# Patient Record
Sex: FEMALE | Race: BLACK OR AFRICAN AMERICAN | NOT HISPANIC OR LATINO | Employment: STUDENT | URBAN - METROPOLITAN AREA
[De-identification: names, ages, dates, MRNs, and addresses within clinical notes are randomized per-mention and may not be internally consistent; named-entity substitution may affect disease eponyms.]

---
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
## 2020-02-23 ENCOUNTER — HEALTH MAINTENANCE LETTER (OUTPATIENT)
Age: 20
End: 2020-02-23

## 2020-03-02 ENCOUNTER — TELEPHONE (OUTPATIENT)
Dept: PSYCHIATRY | Facility: CLINIC | Age: 20
End: 2020-03-02

## 2020-03-02 NOTE — TELEPHONE ENCOUNTER
"Tried to call patient at number on file [phone].  The message received was \"the person you are dialing is not able to receive calls at this time.\"  The phone then started ringing, but no answer after 10 rings.  Attempted to call again with the same result.      "

## 2020-03-02 NOTE — TELEPHONE ENCOUNTER
Patient called and left voicemail message in clinic over weekend. Patient is requesting a call back from Maria Victoria or someone in her care team. She did not provide a reason why in her message.

## 2020-03-03 NOTE — TELEPHONE ENCOUNTER
"Attempted to return patient's call, and the response was still \"the person you are dialing is not able to receive calls at this time.\"   "

## 2020-03-05 NOTE — TELEPHONE ENCOUNTER
"Attempted x2 to reach pt at the number listed in chart. Writer received message, \"The person you have dialed is not able to receive calls at this time.\" Unable to leave a VM.  "

## 2020-03-16 NOTE — TELEPHONE ENCOUNTER
Message   Received: 3 days ago   Message Contents   Nhi Sanderson Emily, RN    Phone Number: [phone]               [facility], would like for Maria Victoria, to give her a call. This is all the info give.     Thanks!      --Placed additional phone call to pt at number listed above. No answer; LVM with writer's return contact information.

## 2020-03-17 ENCOUNTER — MYC MEDICAL ADVICE (OUTPATIENT)
Dept: PSYCHIATRY | Facility: CLINIC | Age: 20
End: 2020-03-17

## 2020-12-06 ENCOUNTER — HEALTH MAINTENANCE LETTER (OUTPATIENT)
Age: 20
End: 2020-12-06

## 2021-03-25 ENCOUNTER — OFFICE VISIT (OUTPATIENT)
Dept: PEDIATRICS | Facility: CLINIC | Age: 21
End: 2021-03-25
Payer: COMMERCIAL

## 2021-03-25 VITALS
HEIGHT: 63 IN | BODY MASS INDEX: 30.67 KG/M2 | HEART RATE: 90 BPM | SYSTOLIC BLOOD PRESSURE: 98 MMHG | OXYGEN SATURATION: 98 % | DIASTOLIC BLOOD PRESSURE: 56 MMHG | TEMPERATURE: 99.1 F | WEIGHT: 173.1 LBS

## 2021-03-25 DIAGNOSIS — E55.9 VITAMIN D DEFICIENCY: Primary | ICD-10-CM

## 2021-03-25 DIAGNOSIS — F90.0 ATTENTION DEFICIT HYPERACTIVITY DISORDER (ADHD), PREDOMINANTLY INATTENTIVE TYPE: ICD-10-CM

## 2021-03-25 DIAGNOSIS — F41.1 GAD (GENERALIZED ANXIETY DISORDER): ICD-10-CM

## 2021-03-25 DIAGNOSIS — F33.1 MODERATE RECURRENT MAJOR DEPRESSION (H): ICD-10-CM

## 2021-03-25 PROCEDURE — 96127 BRIEF EMOTIONAL/BEHAV ASSMT: CPT | Performed by: NURSE PRACTITIONER

## 2021-03-25 PROCEDURE — 99214 OFFICE O/P EST MOD 30 MIN: CPT | Performed by: NURSE PRACTITIONER

## 2021-03-25 RX ORDER — CARBOXYMETHYLCELLULOSE SODIUM 5 MG/ML
1-2 SOLUTION/ DROPS OPHTHALMIC 3 TIMES DAILY PRN
Qty: 1 BOTTLE | Refills: 5 | Status: CANCELLED | OUTPATIENT
Start: 2021-03-25

## 2021-03-25 RX ORDER — SERTRALINE HYDROCHLORIDE 100 MG/1
TABLET, FILM COATED ORAL
Qty: 45 TABLET | Refills: 2 | Status: SHIPPED | OUTPATIENT
Start: 2021-03-25 | End: 2021-08-13

## 2021-03-25 RX ORDER — BUPROPION HYDROCHLORIDE 300 MG/1
300 TABLET ORAL EVERY MORNING
Qty: 90 TABLET | Refills: 3 | Status: SHIPPED | OUTPATIENT
Start: 2021-03-25 | End: 2021-08-20

## 2021-03-25 RX ORDER — MULTIVIT-MIN/IRON/FOLIC ACID/K 18-600-40
2 CAPSULE ORAL DAILY
Qty: 180 CAPSULE | Refills: 3 | Status: SHIPPED | OUTPATIENT
Start: 2021-03-25

## 2021-03-25 RX ORDER — DEXTROAMPHETAMINE SACCHARATE, AMPHETAMINE ASPARTATE MONOHYDRATE, DEXTROAMPHETAMINE SULFATE AND AMPHETAMINE SULFATE 5; 5; 5; 5 MG/1; MG/1; MG/1; MG/1
20 CAPSULE, EXTENDED RELEASE ORAL DAILY
Qty: 30 CAPSULE | Refills: 0 | Status: SHIPPED | OUTPATIENT
Start: 2021-03-25 | End: 2021-06-03

## 2021-03-25 ASSESSMENT — ANXIETY QUESTIONNAIRES
GAD7 TOTAL SCORE: 13
5. BEING SO RESTLESS THAT IT IS HARD TO SIT STILL: MORE THAN HALF THE DAYS
1. FEELING NERVOUS, ANXIOUS, OR ON EDGE: SEVERAL DAYS
3. WORRYING TOO MUCH ABOUT DIFFERENT THINGS: MORE THAN HALF THE DAYS
7. FEELING AFRAID AS IF SOMETHING AWFUL MIGHT HAPPEN: MORE THAN HALF THE DAYS
2. NOT BEING ABLE TO STOP OR CONTROL WORRYING: MORE THAN HALF THE DAYS
IF YOU CHECKED OFF ANY PROBLEMS ON THIS QUESTIONNAIRE, HOW DIFFICULT HAVE THESE PROBLEMS MADE IT FOR YOU TO DO YOUR WORK, TAKE CARE OF THINGS AT HOME, OR GET ALONG WITH OTHER PEOPLE: VERY DIFFICULT
6. BECOMING EASILY ANNOYED OR IRRITABLE: MORE THAN HALF THE DAYS

## 2021-03-25 ASSESSMENT — PATIENT HEALTH QUESTIONNAIRE - PHQ9
5. POOR APPETITE OR OVEREATING: MORE THAN HALF THE DAYS
SUM OF ALL RESPONSES TO PHQ QUESTIONS 1-9: 19

## 2021-03-25 ASSESSMENT — MIFFLIN-ST. JEOR: SCORE: 1519.18

## 2021-03-25 NOTE — PROGRESS NOTES
Assessment & Plan     (E55.9) Vitamin D deficiency  (primary encounter diagnosis)  Plan: Cholecalciferol (VITAMIN D) 50 MCG (2000 UT)         CAPS        Will recheck in a few months    (F33.1) Moderate recurrent major depression (H)  Comment: Much better than in the past. No SI/HI. But worse than when she was actively seein gpsych.   Plan: buPROPion (WELLBUTRIN XL) 300 MG 24 hr tablet,         sertraline (ZOLOFT) 100 MG tablet, MENTAL         HEALTH REFERRAL  - Adult; Outpatient Treatment;        Individual/Couples/Family/Group Therapy/Health         Psychology; G: [facility]         [phone]; We will contact you to schedule        the appointment or please call with any         questions          -Re-start meds. Discussed plan to re-start them one at a time  -Re-start therapy and psychiatry  -Reviewed r/b/se   -Contracted for safety. Crisis resources discussed.     (F41.1) SUMEET (generalized anxiety disorder)  Plan: sertraline (ZOLOFT) 100 MG tablet            (F90.0) Attention deficit hyperactivity disorder (ADHD), predominantly inattentive type  Comment: Re-start meds  Plan: amphetamine-dextroamphetamine (ADDERALL XR) 20         MG 24 hr capsule          ARYAN [employer] CARLOS Marte is a 21 year old who presents for the following health issues     HPI     Depression and Anxiety Follow-Up    How are you doing with your depression since your last visit? Worsened, stopped seeing psychiatry and therapy especially due to COVID-19 restrictions and lack of medication    How are you doing with your anxiety since your last visit?  Worsened, as above    Are you having other symptoms that might be associated with depression or anxiety? Yes:  weight gain, lack of motivation, increase in irritability, more drawn off    Have you had a significant life event? No     Do you have any concerns with your use of alcohol or other drugs? No    Pt  "wants to discuss which are best medications to take at this time.    Social History     Tobacco Use     Smoking status: Never Smoker     Smokeless tobacco: Never Used   Substance Use Topics     Alcohol use: No     Drug use: No     PHQ 9/14/2017 3/22/2018 3/25/2021   PHQ-9 Total Score 17 25 19   Q9: Thoughts of better off dead/self-harm past 2 weeks Not at all Nearly every day Not at all   Some encounter information is confidential and restricted. Go to Review Flowsheets activity to see all data.     SUMEET-7 SCORE 7/10/2017 3/22/2018 3/25/2021   Total Score 14 18 13   Some encounter information is confidential and restricted. Go to Review Flowsheets activity to see all data.     Suicide Assessment Five-step Evaluation and Treatment (SAFE-T)      How many servings of fruits and vegetables do you eat daily?  2-3    On average, how many sweetened beverages do you drink each day (Examples: soda, juice, sweet tea, etc.  Do NOT count diet or artificially sweetened beverages)?   0    How many days per week do you exercise enough to make your heart beat faster? 3 or less    How many minutes a day do you exercise enough to make your heart beat faster? 9 or less  How many days per week do you miss taking your medication?  Took a long break    What makes it hard for you to take your medications?  Took a break from medication starting about 1 year ago       Review of Systems   Constitutional, HEENT, cardiovascular, pulmonary, gi and gu systems are negative, except as otherwise noted.      Objective    BP 98/56 (BP Location: Right arm, Patient Position: Sitting, Cuff Size: Adult Regular)   Pulse 90   Temp 99.1  F (37.3  C) (Tympanic)   Ht 1.6 m (5' 2.99\")   Wt 78.5 kg (173 lb 1.6 oz)   SpO2 98%   BMI 30.67 kg/m    Body mass index is 30.67 kg/m .  Physical Exam   GENERAL: healthy, alert and no distress  PSYCH: mentation appears normal, affect normal/bright      "

## 2021-03-25 NOTE — PATIENT INSTRUCTIONS
1. Start Wellbutrin  2. Start Sertraline (1/2 tab for a week then a full tab from then on)  3. Start Adderall after that  4. Start 2 vitamin D a day    Virtual visit in 6 weeks.   Therapy schedulers will call you   DISPLAY PLAN FREE TEXT

## 2021-03-26 ASSESSMENT — ANXIETY QUESTIONNAIRES: GAD7 TOTAL SCORE: 13

## 2021-04-11 ENCOUNTER — HEALTH MAINTENANCE LETTER (OUTPATIENT)
Age: 21
End: 2021-04-11

## 2021-04-19 ENCOUNTER — TELEPHONE (OUTPATIENT)
Dept: PEDIATRICS | Facility: CLINIC | Age: 21
End: 2021-04-19

## 2021-04-19 NOTE — LETTER
April 23, 2021      Rosanna VERAS Geoffrey  [address]  [place] [address]        Dear Rosanna,       We care about your health and have reviewed your health plan including your medical conditions, medications, and lab results.  Based on this review, it is recommended that you follow up regarding the following health topic(s):  -Depression  -Cervical Cancer Screening  -Wellness (Physical) Visit     We recommend you take the following action(s):  -schedule a WELLNESS (Physical) APPOINTMENT.  We will perform the following labs: pap smear.  -Complete and return the attached PHQ-9 Form.  If your total score is greater than 9, please schedule a followup appointment.  If you answer Yes to question 9, call your clinic between the hours of 8 to 5.  You may also call the [employer] vascular solution Hotline at [phone] ([phone]) any time.     You can call our office and give the answers to the attached questionnaire to a nurse over the phone. Or you can complete this questionnaire in the My Chart message we sent you.  You can also drop the questionnaire off at the clinic or mail it back to us.  We do need this information in your medical chart.      Please call us at the [facility] - [phone] (or use [employer]) to address the above recommendations.     Thank you for trusting [facility] and we appreciate the opportunity to serve you.  We look forward to supporting your healthcare needs in the future.    Healthy Regards,    Your Health Care Team  [facility]

## 2021-04-19 NOTE — TELEPHONE ENCOUNTER
Patient Quality Outreach      Summary:    Patient has the following on her problem list/HM:   Depression / Dysthymia review  ANXIETY  6 Month Remission: 4-8 month window range:   12 Month Remission: 10-14 month window range:   PHQ-9 SCORE 9/14/2017 3/22/2018 3/25/2021   PHQ-9 Total Score 17 25 19   Some encounter information is confidential and restricted. Go to Review Flowsheets activity to see all data.   If PHQ-9 recheck is 5 or more, route to provider for next steps.      Patient is due/failing the following:   Cervical Cancer Screening - PAP Needed, Depression follow-up visit and Adult/Adolescent physical, date due: 6/20/17    Type of outreach:    Sent Offermatic message.    Questions for provider review:    None                                                 Sujatha Davila CMA    Chart routed to Care Team.

## 2021-04-23 NOTE — TELEPHONE ENCOUNTER
Patient Quality Outreach 2nd Attempt      Summary:    Type of outreach:    Sent letter.+ PHQ9/SUMEET    Next Steps:  Reach out within 90 days via Phone.    Max number of attempts reached: No. Will try again in 90 days if patient still on fail list.    Questions for provider review:    None                                                                                                                    Sujatha Davila CMA    Chart routed to Care Team.

## 2021-04-30 NOTE — TELEPHONE ENCOUNTER
Patient Quality Outreach 3rd Attempt      Summary:    Type of outreach:    Phone, left message for patient/parent to call back.    Max number of attempts reached: Yes. Will try again in 90 days if patient still on fail list.    Questions for provider review:    None                                                                                                                    Sujatha Davila CMA    Chart closed.

## 2021-06-03 ENCOUNTER — MYC REFILL (OUTPATIENT)
Dept: PEDIATRICS | Facility: CLINIC | Age: 21
End: 2021-06-03

## 2021-06-03 ENCOUNTER — TELEPHONE (OUTPATIENT)
Dept: PEDIATRICS | Facility: CLINIC | Age: 21
End: 2021-06-03

## 2021-06-03 ENCOUNTER — E-VISIT (OUTPATIENT)
Dept: PEDIATRICS | Facility: CLINIC | Age: 21
End: 2021-06-03
Payer: COMMERCIAL

## 2021-06-03 DIAGNOSIS — F90.0 ATTENTION DEFICIT HYPERACTIVITY DISORDER (ADHD), PREDOMINANTLY INATTENTIVE TYPE: ICD-10-CM

## 2021-06-03 DIAGNOSIS — F40.243 FEAR OF FLYING: Primary | ICD-10-CM

## 2021-06-03 PROCEDURE — 99421 OL DIG E/M SVC 5-10 MIN: CPT | Performed by: NURSE PRACTITIONER

## 2021-06-03 RX ORDER — LORAZEPAM 0.5 MG/1
0.5 TABLET ORAL EVERY 6 HOURS PRN
Qty: 6 TABLET | Refills: 0 | Status: SHIPPED | OUTPATIENT
Start: 2021-06-03

## 2021-06-03 RX ORDER — DEXTROAMPHETAMINE SACCHARATE, AMPHETAMINE ASPARTATE MONOHYDRATE, DEXTROAMPHETAMINE SULFATE AND AMPHETAMINE SULFATE 5; 5; 5; 5 MG/1; MG/1; MG/1; MG/1
20 CAPSULE, EXTENDED RELEASE ORAL DAILY
Qty: 30 CAPSULE | Refills: 0 | Status: SHIPPED | OUTPATIENT
Start: 2021-06-03 | End: 2021-08-13

## 2021-06-03 RX ORDER — DEXTROAMPHETAMINE SULFATE, DEXTROAMPHETAMINE SACCHARATE, AMPHETAMINE SULFATE AND AMPHETAMINE ASPARTATE 5; 5; 5; 5 MG/1; MG/1; MG/1; MG/1
CAPSULE, EXTENDED RELEASE ORAL
Qty: 30 CAPSULE | Refills: 0 | Status: SHIPPED | OUTPATIENT
Start: 2021-06-03 | End: 2022-10-28

## 2021-06-03 ASSESSMENT — PATIENT HEALTH QUESTIONNAIRE - PHQ9
SUM OF ALL RESPONSES TO PHQ QUESTIONS 1-9: 19
SUM OF ALL RESPONSES TO PHQ QUESTIONS 1-9: 19
10. IF YOU CHECKED OFF ANY PROBLEMS, HOW DIFFICULT HAVE THESE PROBLEMS MADE IT FOR YOU TO DO YOUR WORK, TAKE CARE OF THINGS AT HOME, OR GET ALONG WITH OTHER PEOPLE: VERY DIFFICULT

## 2021-06-03 ASSESSMENT — ANXIETY QUESTIONNAIRES
GAD7 TOTAL SCORE: 15
7. FEELING AFRAID AS IF SOMETHING AWFUL MIGHT HAPPEN: MORE THAN HALF THE DAYS
1. FEELING NERVOUS, ANXIOUS, OR ON EDGE: NEARLY EVERY DAY
7. FEELING AFRAID AS IF SOMETHING AWFUL MIGHT HAPPEN: MORE THAN HALF THE DAYS
3. WORRYING TOO MUCH ABOUT DIFFERENT THINGS: MORE THAN HALF THE DAYS
4. TROUBLE RELAXING: MORE THAN HALF THE DAYS
5. BEING SO RESTLESS THAT IT IS HARD TO SIT STILL: MORE THAN HALF THE DAYS
GAD7 TOTAL SCORE: 15
GAD7 TOTAL SCORE: 15
2. NOT BEING ABLE TO STOP OR CONTROL WORRYING: NEARLY EVERY DAY
6. BECOMING EASILY ANNOYED OR IRRITABLE: SEVERAL DAYS

## 2021-06-03 NOTE — TELEPHONE ENCOUNTER
Routing refill request to provider for review/approval because:  Drug not on the [facility] refill protocol     Liz Demarco RN   [facility] -- Triage Nurse

## 2021-06-03 NOTE — TELEPHONE ENCOUNTER
Routing refill request to provider for review/approval because:  Drug not on the [facility] refill protocol     Liz Deamrco RN   [facility] -- Triage Nurse

## 2021-06-03 NOTE — TELEPHONE ENCOUNTER
"Received call from pt.    Pt requested refills on medications and requesting a new medication for anxiety for an upcoming vacation.    Reviewed pt's medication, pt has remaining refills on sertraline (Zoloft), bupropion (Wellbutrin), and vitamin D, but pt has not refills on adderall XR 20 mg. Encouraged pt to request refill of adderall XR in [facility], pt agreed and successfully requesting during phone call.    Pt also encouraged to start an eVisit to discuss medication for anxiety during travel.      - Stone \"Diego\" Daivd, RN - Patient Advocate Liason (PAL)  [facility]    "

## 2021-06-04 ASSESSMENT — ANXIETY QUESTIONNAIRES: GAD7 TOTAL SCORE: 15

## 2021-06-04 ASSESSMENT — PATIENT HEALTH QUESTIONNAIRE - PHQ9: SUM OF ALL RESPONSES TO PHQ QUESTIONS 1-9: 19

## 2021-06-04 NOTE — TELEPHONE ENCOUNTER
Call placed to pt  Pt states she has no thoughts of hurting herself or feels like she is better off dead    Pt states she will respond to her E-visit  Relayed message about the lorazepam    Pt verbalized understanding and agrees to the plan    Thank you  Nicki Zambrano RN on 6/4/2021 at 1:00 PM

## 2021-07-05 ENCOUNTER — FCC EXTENDED DOCUMENTATION (OUTPATIENT)
Dept: PSYCHOLOGY | Facility: CLINIC | Age: 21
End: 2021-07-05

## 2021-07-05 NOTE — PROGRESS NOTES
Reason for call: called patient after they did not respond to link sent for visit.    Outcome: A message was left with patient reminding them of today's visit.    Follow-up: Patient was [facility].    GUALBERTO Umana

## 2021-08-09 ENCOUNTER — TELEPHONE (OUTPATIENT)
Dept: PEDIATRICS | Facility: CLINIC | Age: 21
End: 2021-08-09

## 2021-08-09 NOTE — LETTER
August 19, 2021      Rosanna VERAS Geoffrey  [address]  [place] [address]        Dear Rosanna,       We care about your health and have reviewed your health plan including your medical conditions, medications, and lab results.  Based on this review, it is recommended that you follow up regarding the following health topic(s):  -Cervical Cancer Screening  -Wellness (Physical) Visit     We recommend you take the following action(s):  -schedule a WELLNESS (Physical) APPOINTMENT.  We will perform the following labs: pap smear.     Please call us at the [facility] - [phone] (or use [employer]) to address the above recommendations.     Thank you for trusting [facility] and we appreciate the opportunity to serve you.  We look forward to supporting your healthcare needs in the future.    Healthy Regards,    Your Health Care Team  [facility]

## 2021-08-09 NOTE — TELEPHONE ENCOUNTER
Patient Quality Outreach      Summary:    Patient has the following on her problem list/HM:   Depression / Dysthymia review  ANXIETY  6 Month Remission: 4-8 month window range:   12 Month Remission: 10-14 month window range:  PHQ-9 SCORE 3/22/2018 3/25/2021 6/3/2021   PHQ-9 Total Score MyChart - - 19 (Moderately severe depression)   PHQ-9 Total Score 25 19 19   Some encounter information is confidential and restricted. Go to Review Flowsheets activity to see all data.   If PHQ-9 recheck is 5 or more, route to provider for next steps.      Patient is due/failing the following:   Cervical Cancer Screening - PAP Needed and Adult/Adolescent physical, date due: 6/20/17    Type of outreach:    Sent [employer] message.    Questions for provider review:    None                                              Sujatha Davila CMA    Chart routed to Care Team.

## 2021-08-13 ENCOUNTER — MYC REFILL (OUTPATIENT)
Dept: PEDIATRICS | Facility: CLINIC | Age: 21
End: 2021-08-13

## 2021-08-13 DIAGNOSIS — F41.1 GAD (GENERALIZED ANXIETY DISORDER): ICD-10-CM

## 2021-08-13 DIAGNOSIS — F90.0 ATTENTION DEFICIT HYPERACTIVITY DISORDER (ADHD), PREDOMINANTLY INATTENTIVE TYPE: ICD-10-CM

## 2021-08-13 DIAGNOSIS — F33.1 MODERATE RECURRENT MAJOR DEPRESSION (H): ICD-10-CM

## 2021-08-16 NOTE — TELEPHONE ENCOUNTER
Routing refill request to provider for review/approval because:  Drug not on the [facility] refill protocol   See Mychart request  Confirm ongoing sertraline dose  Aysha Nuñez RN, BSN  Message handled by CLINIC NURSE.

## 2021-08-17 ENCOUNTER — MYC MEDICAL ADVICE (OUTPATIENT)
Dept: PEDIATRICS | Facility: CLINIC | Age: 21
End: 2021-08-17

## 2021-08-17 NOTE — LETTER
Dear Rosanna,        We are concerned about your health. You are overdue for the physical and your pap. Please call to schedule this appointment. To schedule with [facility], please call [phone]. To schedule with [facility], please call [phone].    Also we want to make sure you have scheduled with therapy/psychology. To schedule with them, please call [phone].    Let us know if you have any questions or concerns. Thank you for choosing [facility]!        Sincerely,        Your Team at [facility]

## 2021-08-17 NOTE — TELEPHONE ENCOUNTER
Called pt. No answer, LVM to call back on my personal extension. Also sent [employer] message.    If pt calls back:  Ask if pt has scheduled with psych  Route answer to PCP    Fernando Oro, EMT at 8:40 AM on August 17, 2021   [facility] Health Guide   [phone]

## 2021-08-19 NOTE — TELEPHONE ENCOUNTER
Patient Quality Outreach 2nd Attempt      Summary:    Type of outreach:    Sent letter.    Next Steps:  Reach out within 90 days via Phone.    Max number of attempts reached: No. Will try again in 90 days if patient still on fail list.    Questions for provider review:    None                                                                                                                    Sujatha Davila CMA    Chart routed to Care Team.

## 2021-08-19 NOTE — TELEPHONE ENCOUNTER
Called pt. Attempt #2. No answer, LVM to call back on my personal extension. Pt read Xtime message on 8/17/21 but did not respond.     If pt calls back:  Ask if pt has scheduled with psych  Route answer to PCP    Fernando Oro, EMT at 10:27 AM on August 19, 2021   [facility] Health Guide   [phone]

## 2021-08-20 RX ORDER — DEXTROAMPHETAMINE SACCHARATE, AMPHETAMINE ASPARTATE MONOHYDRATE, DEXTROAMPHETAMINE SULFATE AND AMPHETAMINE SULFATE 5; 5; 5; 5 MG/1; MG/1; MG/1; MG/1
20 CAPSULE, EXTENDED RELEASE ORAL DAILY
Qty: 30 CAPSULE | Refills: 0 | Status: CANCELLED | OUTPATIENT
Start: 2021-08-20

## 2021-08-20 RX ORDER — BUPROPION HYDROCHLORIDE 300 MG/1
300 TABLET ORAL EVERY MORNING
Qty: 30 TABLET | Refills: 0 | Status: SHIPPED | OUTPATIENT
Start: 2021-08-20 | End: 2022-10-02

## 2021-08-20 RX ORDER — DEXTROAMPHETAMINE SACCHARATE, AMPHETAMINE ASPARTATE MONOHYDRATE, DEXTROAMPHETAMINE SULFATE AND AMPHETAMINE SULFATE 5; 5; 5; 5 MG/1; MG/1; MG/1; MG/1
20 CAPSULE, EXTENDED RELEASE ORAL DAILY
Qty: 30 CAPSULE | Refills: 0 | Status: SHIPPED | OUTPATIENT
Start: 2021-08-20 | End: 2022-10-02

## 2021-08-20 RX ORDER — SERTRALINE HYDROCHLORIDE 100 MG/1
100 TABLET, FILM COATED ORAL DAILY
Qty: 30 TABLET | Refills: 0 | Status: SHIPPED | OUTPATIENT
Start: 2021-08-20 | End: 2022-10-02

## 2021-08-20 NOTE — TELEPHONE ENCOUNTER
I will fill but yes she needs to schedule with psych and also due for visit with PCP with pap so please get scheduled and we can [place] back on the referral

## 2021-08-20 NOTE — TELEPHONE ENCOUNTER
"Called pt.    Pt has not scheduled with psych. She notes she is \"going through a lot of changes\" and \"didn't have time to call them.\" Gave pt scheduling number.    She notes she is out of all medications and is requesting a refill on her Bupropion as well.    She has no further questions or concerns at this time. Encouraged pt to reach out with further questions or concerns. Pt agreeable to plan and verbalized understanding.    Fernando Oro, EMT at 1:30 PM on August 20, 2021   [facility] Health Guide   [phone]  "

## 2021-08-20 NOTE — TELEPHONE ENCOUNTER
See other encounter.  Needs visit with PCP with pap.  Also yes agree with psych referral  Reviewed -no concerns

## 2021-08-20 NOTE — TELEPHONE ENCOUNTER
Called pt. No answer, LVM to call back on my personal extension or if I am unavailable to call clinic back.    If pt calls back:  Schedule phys + pap with PCP (first available is 9/9/21 at time of this documentation)  Follow up to make sure psych is scheduled.    Fernando Oro, EMT at 3:25 PM on August 20, 2021   [facility]inic Health Guide   [phone]

## 2021-08-20 NOTE — TELEPHONE ENCOUNTER
Called pt. No answer, LVM to call back on my personal extension or if I am unavailable to call clinic back.    If pt calls back:  Schedule phys + pap with PCP (first available is 9/9/21 at time of this documentation)  Follow up to make sure psych is scheduled.    Fernando Oro, EMT at 3:25 PM on August 20, 2021   [facility]inic Health Guide   [phone]

## 2021-08-30 NOTE — TELEPHONE ENCOUNTER
Called pt. She noted she would like to schedule with [facility] as it is closer to her. Gave her number to schedule with their clinic. She has no questions or concerns at this time.    Encouraged pt to reach out with further questions or concerns. Pt agreeable to plan and verbalized understanding.    Fernando Oro, EMT at 9:20 AM on August 30, 2021   [facility] Health Guide   [phone]

## 2021-08-30 NOTE — TELEPHONE ENCOUNTER
Called pt. She noted she would like to schedule with Malia Magoffin as it is closer to her. Gave her number to schedule with their clinic. She has no questions or concerns at this time.    Encouraged pt to reach out with further questions or concerns. Pt agreeable to plan and verbalized understanding.    Fernando Oro, EMT at 9:20 AM on August 30, 2021   [facility] Health Guide   [phone]

## 2021-09-13 NOTE — TELEPHONE ENCOUNTER
Pt has not scheduled appt with Malia [facility]. Called pt. Attempt #2. No answer, LVM to call [phone] for Malia Chilel or to call [phone] to schedule with Matty.    If pt calls back:  Schedule phys + pap with PCP (first available is 9/9/21 at time of this documentation)  Follow up to make sure psych is scheduled.    Fernando Oro, EMT at 4:12 PM on September 13, 2021   [facility] Health Guide   [phone]

## 2021-09-14 NOTE — TELEPHONE ENCOUNTER
Pt has not scheduled appt with Malia [place]. Called pt. Attempt #2. No answer, LVM to call [phone] for Malia Chilel or to call [phone] to schedule with Matty.     If pt calls back:  Schedule phys + pap with PCP (first available is 9/9/21 at time of this documentation)  Follow up to make sure psych is scheduled.     Fernando Oro, EMT at 4:12 PM on September 13, 2021   [facility] Health Guide   [phone]

## 2021-09-21 NOTE — TELEPHONE ENCOUNTER
Pt has not scheduled appt with Malia Vance. Called pt. Attempt #3. No answer, LVM to call [phone] for Malia Chilel or to call [phone] to schedule with Matty. Also sent letter. No further outreaches will be made at this time.     If pt calls back:  Schedule phys + pap with PCP  Follow up to make sure psych is scheduled.    Fernando Oro, EMT at 10:14 AM on September 21, 2021   [facility] Health Guide   [phone]

## 2021-09-21 NOTE — TELEPHONE ENCOUNTER
Pt has not scheduled appt with Malia [place]. Called pt. Attempt #3. No answer, LVM to call [phone] for Malia Chilel or to call [phone] to schedule with Matty. Also sent letter. No further outreaches will be made at this time.     If pt calls back:  Schedule phys + pap with PCP  Follow up to make sure psych is scheduled.     Fernando Oro, EMT at 10:14 AM on September 21, 2021   [facility] Health Guide   [phone]

## 2021-09-25 ENCOUNTER — HEALTH MAINTENANCE LETTER (OUTPATIENT)
Age: 21
End: 2021-09-25

## 2022-02-07 ENCOUNTER — TELEPHONE (OUTPATIENT)
Dept: PEDIATRICS | Facility: CLINIC | Age: 22
End: 2022-02-07
Payer: MEDICAID

## 2022-02-07 NOTE — TELEPHONE ENCOUNTER
Patient Quality Outreach    Patient is due for the following:   Cervical Cancer Screening - PAP Needed  Depression  -  PHQ-9 Needed & SUMEET  Physical  - Due after 6/20/2017  Immunizations  -  Covid and Influenza    NEXT STEPS:   Schedule a yearly physical    Type of outreach:    Sent [employer]alendra message. + PHQ9/SUMEET    Questions for provider review:    None     Sujatha Davila MA  Chart routed to Care Team.

## 2022-02-07 NOTE — LETTER
February 14, 2022      Rosanna VERAS Geoffrey  [address]  [place] [address]        Dear Rosanna,       We care about your health and have reviewed your health plan including your medical conditions, medications, and lab results.  Based on this review, it is recommended that you follow up regarding the following health topic(s):  -Depression  -Cervical Cancer Screening  -Wellness (Physical) Visit     We recommend you take the following action(s):  -schedule a WELLNESS (Physical) APPOINTMENT.  We will perform the following labs: pap smear.  -Complete and return the attached PHQ-9 Form.  If your total score is greater than 9, please schedule a followup appointment.  If you answer Yes to question 9, call your clinic between the hours of 8 to 5.  You may also call the [employer] Hotline at [phone] ([phone] any time.     You can call our office and give the answers to the attached questionnaire to a nurse over the phone. Or you can complete this questionnaire in the My Chart message we sent you.  You can also drop the questionnaire off at the clinic or mail it back to us.  We do need this information in your medical chart.    Please call us at the [facility] - [phone] (or use [employer]) to address the above recommendations.     Thank you for trusting [facility] and we appreciate the opportunity to serve you.  We look forward to supporting your healthcare needs in the future.    Healthy Regards,  Your Health Care Team  [facility]

## 2022-05-07 ENCOUNTER — HEALTH MAINTENANCE LETTER (OUTPATIENT)
Age: 22
End: 2022-05-07

## 2022-05-14 ENCOUNTER — MYC REFILL (OUTPATIENT)
Dept: PEDIATRICS | Facility: CLINIC | Age: 22
End: 2022-05-14
Payer: MEDICAID

## 2022-05-14 DIAGNOSIS — F90.0 ATTENTION DEFICIT HYPERACTIVITY DISORDER (ADHD), PREDOMINANTLY INATTENTIVE TYPE: ICD-10-CM

## 2022-05-14 DIAGNOSIS — F33.1 MODERATE RECURRENT MAJOR DEPRESSION (H): ICD-10-CM

## 2022-05-14 DIAGNOSIS — F41.1 GAD (GENERALIZED ANXIETY DISORDER): ICD-10-CM

## 2022-05-17 NOTE — TELEPHONE ENCOUNTER
Routing refill request to provider for review/approval because:  PHQ-9 and SUMEET-7 elevated.     PHQ 3/22/2018 3/25/2021 6/3/2021   PHQ-9 Total Score 25 19 19   Q9: Thoughts of better off dead/self-harm past 2 weeks Nearly every day Not at all Several days   F/U: Thoughts of suicide or self-harm - - No   F/U: Safety concerns - - No   Some encounter information is confidential and restricted. Go to Review Flowsheets activity to see all data.     SUMEET-7 SCORE 3/22/2018 3/25/2021 6/3/2021   Total Score - - 15 (severe anxiety)   Total Score 18 13 15   Some encounter information is confidential and restricted. Go to Review Flowsheets activity to see all data.     Gila Bose RN

## 2022-05-18 RX ORDER — DEXTROAMPHETAMINE SACCHARATE, AMPHETAMINE ASPARTATE MONOHYDRATE, DEXTROAMPHETAMINE SULFATE AND AMPHETAMINE SULFATE 5; 5; 5; 5 MG/1; MG/1; MG/1; MG/1
20 CAPSULE, EXTENDED RELEASE ORAL DAILY
Qty: 30 CAPSULE | Refills: 0 | OUTPATIENT
Start: 2022-05-18

## 2022-05-18 NOTE — TELEPHONE ENCOUNTER
It seems pt has not been taking these meds. If that's true, should schedule visit to re-start. I see she's scheduled for a visit in July. Recommend evisit or phone visit much sooner than that

## 2022-06-07 RX ORDER — BUPROPION HYDROCHLORIDE 300 MG/1
300 TABLET ORAL EVERY MORNING
Qty: 30 TABLET | Refills: 0 | Status: CANCELLED | OUTPATIENT
Start: 2022-06-07

## 2022-06-07 RX ORDER — SERTRALINE HYDROCHLORIDE 100 MG/1
100 TABLET, FILM COATED ORAL DAILY
Qty: 30 TABLET | Refills: 0 | Status: CANCELLED | OUTPATIENT
Start: 2022-06-07

## 2022-06-07 NOTE — TELEPHONE ENCOUNTER
Attempt #3. Called and LVM with direct number for call back.    Louisa Heller, EMT at 10:22 AM on June 7, 2022  [facility] Health Guide  [phone]

## 2022-10-28 ENCOUNTER — TELEPHONE (OUTPATIENT)
Dept: PEDIATRICS | Facility: CLINIC | Age: 22
End: 2022-10-28

## 2022-10-28 ENCOUNTER — OFFICE VISIT (OUTPATIENT)
Dept: PEDIATRICS | Facility: CLINIC | Age: 22
End: 2022-10-28
Payer: COMMERCIAL

## 2022-10-28 VITALS
OXYGEN SATURATION: 98 % | BODY MASS INDEX: 35.26 KG/M2 | TEMPERATURE: 98 F | RESPIRATION RATE: 16 BRPM | HEIGHT: 63 IN | WEIGHT: 199 LBS | HEART RATE: 86 BPM | SYSTOLIC BLOOD PRESSURE: 98 MMHG | DIASTOLIC BLOOD PRESSURE: 66 MMHG

## 2022-10-28 DIAGNOSIS — F90.0 ATTENTION DEFICIT HYPERACTIVITY DISORDER (ADHD), PREDOMINANTLY INATTENTIVE TYPE: ICD-10-CM

## 2022-10-28 DIAGNOSIS — F33.1 MODERATE RECURRENT MAJOR DEPRESSION (H): Primary | ICD-10-CM

## 2022-10-28 DIAGNOSIS — Z23 NEEDS FLU SHOT: ICD-10-CM

## 2022-10-28 DIAGNOSIS — F41.1 GAD (GENERALIZED ANXIETY DISORDER): ICD-10-CM

## 2022-10-28 PROCEDURE — 99214 OFFICE O/P EST MOD 30 MIN: CPT | Mod: 25 | Performed by: NURSE PRACTITIONER

## 2022-10-28 PROCEDURE — 90686 IIV4 VACC NO PRSV 0.5 ML IM: CPT | Performed by: NURSE PRACTITIONER

## 2022-10-28 PROCEDURE — 90471 IMMUNIZATION ADMIN: CPT | Performed by: NURSE PRACTITIONER

## 2022-10-28 RX ORDER — SERTRALINE HYDROCHLORIDE 100 MG/1
100 TABLET, FILM COATED ORAL DAILY
Qty: 90 TABLET | Refills: 0 | Status: SHIPPED | OUTPATIENT
Start: 2022-10-28 | End: 2023-03-09

## 2022-10-28 RX ORDER — BUPROPION HYDROCHLORIDE 300 MG/1
300 TABLET ORAL EVERY MORNING
Qty: 90 TABLET | Refills: 0 | Status: SHIPPED | OUTPATIENT
Start: 2022-10-28 | End: 2023-03-09

## 2022-10-28 ASSESSMENT — ANXIETY QUESTIONNAIRES
6. BECOMING EASILY ANNOYED OR IRRITABLE: SEVERAL DAYS
GAD7 TOTAL SCORE: 8
GAD7 TOTAL SCORE: 8
5. BEING SO RESTLESS THAT IT IS HARD TO SIT STILL: SEVERAL DAYS
GAD7 TOTAL SCORE: 8
2. NOT BEING ABLE TO STOP OR CONTROL WORRYING: SEVERAL DAYS
IF YOU CHECKED OFF ANY PROBLEMS ON THIS QUESTIONNAIRE, HOW DIFFICULT HAVE THESE PROBLEMS MADE IT FOR YOU TO DO YOUR WORK, TAKE CARE OF THINGS AT HOME, OR GET ALONG WITH OTHER PEOPLE: SOMEWHAT DIFFICULT
3. WORRYING TOO MUCH ABOUT DIFFERENT THINGS: MORE THAN HALF THE DAYS
8. IF YOU CHECKED OFF ANY PROBLEMS, HOW DIFFICULT HAVE THESE MADE IT FOR YOU TO DO YOUR WORK, TAKE CARE OF THINGS AT HOME, OR GET ALONG WITH OTHER PEOPLE?: SOMEWHAT DIFFICULT
7. FEELING AFRAID AS IF SOMETHING AWFUL MIGHT HAPPEN: SEVERAL DAYS
4. TROUBLE RELAXING: SEVERAL DAYS
1. FEELING NERVOUS, ANXIOUS, OR ON EDGE: SEVERAL DAYS
7. FEELING AFRAID AS IF SOMETHING AWFUL MIGHT HAPPEN: SEVERAL DAYS

## 2022-10-28 ASSESSMENT — PATIENT HEALTH QUESTIONNAIRE - PHQ9
10. IF YOU CHECKED OFF ANY PROBLEMS, HOW DIFFICULT HAVE THESE PROBLEMS MADE IT FOR YOU TO DO YOUR WORK, TAKE CARE OF THINGS AT HOME, OR GET ALONG WITH OTHER PEOPLE: VERY DIFFICULT
SUM OF ALL RESPONSES TO PHQ QUESTIONS 1-9: 14
SUM OF ALL RESPONSES TO PHQ QUESTIONS 1-9: 14

## 2022-10-28 ASSESSMENT — PAIN SCALES - GENERAL: PAINLEVEL: NO PAIN (0)

## 2022-10-28 NOTE — PATIENT INSTRUCTIONS
I want you to schedule with therapist    Have a great trip     When you get back it is important to keep your visit with Elisa

## 2022-10-28 NOTE — TELEPHONE ENCOUNTER
The pt is aware and scheduled for her upcoming appointments. Scheduled by Carlyle GOMEZ( staff).  Addie Everett on 10/28/2022 at 9:40 AM

## 2022-10-28 NOTE — PROGRESS NOTES
Assessment & Plan   SUMEET (generalized anxiety disorder)  Moderate recurrent major depression (H)  Pt had not been taking her meds regularly for at least 1 year. She now feels motivated to take her meds and has been regularly for the past 3 weeks. Recent worsening depression (brother tried to commit suicide a couple months ago). Not interested in seeing psych but open to therapist so referral was placed. Stressed importance of follow-up with her PCP as well. She reassures me no thoughts of self harm or suicide. She will be traveling [place] [facility] [facility] [place] [facility] for a couple months and is excited about this.  - sertraline (ZOLOFT) 100 MG tablet; Take 1 tablet (100 mg) by mouth daily  - buPROPion (WELLBUTRIN XL) 300 MG 24 hr tablet; Take 1 tablet (300 mg) by mouth every morning  - Adult Mental Health  Referral; Future    Attention deficit hyperactivity disorder (ADHD) predominantly inattentive type  Recent restart about 3 weeks ago. Will be out of the country [place] 2 mos so with next refill may need 90 day supply.      Needs flu shot  - INFLUENZA VACCINE IM > 6 MONTHS VALENT IIV4 (AFLURIA/FLUZONE)      Patient Instructions   I want you to schedule with therapist    Have a great trip     When you get back it is important to keep your visit with Elisa        No follow-ups on file.    Kayla Lyon NP  [facility] CARLOS shafer is a 22 year old, presenting for the following health issues:  Recheck Medication      History of Present Illness       Mental Health Follow-up:  Patient presents to follow-up on Depression & Anxiety.Patient's depression since last visit has been:  Medium  The patient is having other symptoms associated with depression.  Patient's anxiety since last visit has been:  Medium  The patient is having other symptoms associated with anxiety.  Any significant life events: grief or loss  Patient is feeling anxious or having panic attacks.  Patient has concerns about alcohol or  "drug use.    She eats 2-3 servings of fruits and vegetables daily.She consumes 1 sweetened beverage(s) daily.She exercises with enough effort to increase her heart rate 9 or less minutes per day.  She exercises with enough effort to increase her heart rate 3 or less days per week. She is missing 6 dose(s) of medications per week.    Today's PHQ-9         PHQ-9 Total Score: 14    PHQ-9 Q9 Thoughts of better off dead/self-harm past 2 weeks :   Not at all    How difficult have these problems made it for you to do your work, take care of things at home, or get along with other people: Very difficult  Today's SUMEET-7 Score: 8     Depart to [facility] and [facility] from Nov-Jan.  She is excited to see her grandma    Doesn't have therapist or psych right now   For the past year hasn't been taking meds, then restarted about 3 weeks ago. Initially had \"some side effects\" but feels good now  Her brother recently tried to commit suicide a couple months ago and this worsened her mood  The patient denies any thought of suicide, self harm, or harming others.    She is in school at [employer] so restarted adderall with school    Review of Systems         Objective    BP 98/66 (BP Location: Right arm, Patient Position: Sitting, Cuff Size: Adult Large)   Pulse 86   Temp 98  F (36.7  C) (Oral)   Resp 16   Ht 1.6 m (5' 3\")   Wt 90.3 kg (199 lb)   SpO2 98%   BMI 35.25 kg/m    Body mass index is 35.25 kg/m .  Physical Exam   GENERAL: healthy, alert and no distress  PSYCH: Calm, cooperative, pleasant                    "

## 2022-10-28 NOTE — TELEPHONE ENCOUNTER
Pt showed up late for her med check    Needs 2 appointments  1. Physical with Elisa Rivera  2. Reschedule med check with myself before that    ARYAN Vee, CNP

## 2022-10-31 ENCOUNTER — TELEPHONE (OUTPATIENT)
Dept: PEDIATRICS | Facility: CLINIC | Age: 22
End: 2022-10-31

## 2022-10-31 NOTE — TELEPHONE ENCOUNTER
Patient Quality Outreach    Patient is due for the following:   Cervical Cancer Screening - PAP Needed  Physical Preventive Adult Physical,  - Due after 6/20/17  Chlamydia Screening    Next Steps:   Patient was scheduled for 1/17/23 w/ Miguel    Type of outreach:    Chart review performed, no outreach needed.    Questions for provider review:    None     Sujatha Davila MA  Chart closed.

## 2023-01-30 ENCOUNTER — MYC REFILL (OUTPATIENT)
Dept: PEDIATRICS | Facility: CLINIC | Age: 23
End: 2023-01-30
Payer: COMMERCIAL

## 2023-01-30 DIAGNOSIS — F33.1 MODERATE RECURRENT MAJOR DEPRESSION (H): ICD-10-CM

## 2023-01-30 DIAGNOSIS — F41.1 GAD (GENERALIZED ANXIETY DISORDER): ICD-10-CM

## 2023-01-30 DIAGNOSIS — F90.0 ATTENTION DEFICIT HYPERACTIVITY DISORDER (ADHD), PREDOMINANTLY INATTENTIVE TYPE: ICD-10-CM

## 2023-01-30 RX ORDER — BUPROPION HYDROCHLORIDE 300 MG/1
300 TABLET ORAL EVERY MORNING
Qty: 90 TABLET | Refills: 0 | Status: CANCELLED | OUTPATIENT
Start: 2023-01-30

## 2023-01-30 RX ORDER — SERTRALINE HYDROCHLORIDE 100 MG/1
100 TABLET, FILM COATED ORAL DAILY
Qty: 90 TABLET | Refills: 0 | Status: CANCELLED | OUTPATIENT
Start: 2023-01-30

## 2023-01-30 NOTE — LETTER
February 7, 2023      Rosanna VERAS Geoffrey  [address]  [place] [address]        Dear Rosanna,       We care about your health and have reviewed your health plan including your medical conditions, medications, and lab results.  Based on this review, it is recommended that you follow up regarding the following health topic(s):  -Unable to refill your medication until an in person appointment is made with Elisa.    Please call us at the [facility] - [phone] (or use QFPay) to address the above recommendations.     Thank you for trusting [facility] and we appreciate the opportunity to serve you.  We look forward to supporting your healthcare needs in the future.    Healthy Regards,    Your Health Care Team  [facility]

## 2023-01-30 NOTE — TELEPHONE ENCOUNTER
Routing refill request to provider for review/approval because:  PHQ-9 out of range    PHQ-9 score:    PHQ 10/28/2022   PHQ-9 Total Score 14   Q9: Thoughts of better off dead/self-harm past 2 weeks Not at all   F/U: Thoughts of suicide or self-harm -   F/U: Safety concerns -   Some encounter information is confidential and restricted. Go to Review Flowsheets activity to see all data.     Sandra CHO RN, BSN, PHN  [facility]  [phone]

## 2023-01-31 NOTE — TELEPHONE ENCOUNTER
Needs in person appointment within 30 days with me in order to refill. If she no shows, I will not fill until she is seen by me. She no showed last 4 appts. Ok to use JAE

## 2023-02-01 NOTE — TELEPHONE ENCOUNTER
She did not respond to [employer] request to schedule. PLease call her to schedule. I can fill adderall plus these meds once she schedules in person

## 2023-02-07 RX ORDER — DEXTROAMPHETAMINE SACCHARATE, AMPHETAMINE ASPARTATE MONOHYDRATE, DEXTROAMPHETAMINE SULFATE AND AMPHETAMINE SULFATE 5; 5; 5; 5 MG/1; MG/1; MG/1; MG/1
20 CAPSULE, EXTENDED RELEASE ORAL DAILY
Qty: 30 CAPSULE | Refills: 0 | Status: CANCELLED | OUTPATIENT
Start: 2023-02-07

## 2023-02-08 NOTE — TELEPHONE ENCOUNTER
See refill from 1/30/23. The pt has been called three times and letter mailed.   Addie Everett on 2/8/2023 at 8:51 AM

## 2023-03-09 DIAGNOSIS — F33.1 MODERATE RECURRENT MAJOR DEPRESSION (H): ICD-10-CM

## 2023-03-09 DIAGNOSIS — F41.1 GAD (GENERALIZED ANXIETY DISORDER): ICD-10-CM

## 2023-03-09 DIAGNOSIS — F90.0 ATTENTION DEFICIT HYPERACTIVITY DISORDER (ADHD), PREDOMINANTLY INATTENTIVE TYPE: ICD-10-CM

## 2023-03-09 RX ORDER — BUPROPION HYDROCHLORIDE 300 MG/1
300 TABLET ORAL EVERY MORNING
Qty: 90 TABLET | Refills: 0 | Status: SHIPPED | OUTPATIENT
Start: 2023-03-09

## 2023-03-09 RX ORDER — DEXTROAMPHETAMINE SACCHARATE, AMPHETAMINE ASPARTATE MONOHYDRATE, DEXTROAMPHETAMINE SULFATE AND AMPHETAMINE SULFATE 5; 5; 5; 5 MG/1; MG/1; MG/1; MG/1
20 CAPSULE, EXTENDED RELEASE ORAL DAILY
Qty: 30 CAPSULE | Refills: 0 | OUTPATIENT
Start: 2023-03-09

## 2023-03-09 RX ORDER — SERTRALINE HYDROCHLORIDE 100 MG/1
100 TABLET, FILM COATED ORAL DAILY
Qty: 90 TABLET | Refills: 0 | Status: SHIPPED | OUTPATIENT
Start: 2023-03-09

## 2023-03-09 NOTE — TELEPHONE ENCOUNTER
The pt is aware and scheduled for her upcoming appointment. Please fill until the pt can be seen in person on 3/21/23. Thank you.    Addie Everett on 3/9/2023 at 1:40 PM

## 2023-03-09 NOTE — TELEPHONE ENCOUNTER
Routing refill request to provider for review/approval because:  Drug not on the [facility] refill protocol   A break in medication  PHQ-9 score out of range.     Suzi SOSA RN   PAL (Patient Advocate Liaison)  [facility]

## 2023-03-13 ENCOUNTER — TELEPHONE (OUTPATIENT)
Dept: PEDIATRICS | Facility: CLINIC | Age: 23
End: 2023-03-13
Payer: COMMERCIAL

## 2023-03-13 NOTE — TELEPHONE ENCOUNTER
Patient Quality Outreach    Patient is due for the following:   Cervical Cancer Screening - PAP Needed  Physical Preventive Adult Physical,  - Due after 2017      Topic Date Due     COVID-19 Vaccine (3 - Booster for Moderna series) 12/06/2022     Next Steps:   Patient was scheduled for phys on 3/21/23    Type of outreach:    Chart review performed, no outreach needed.    Questions for provider review:    None     Sujatha Davila MA  Chart closed.

## 2023-06-02 ENCOUNTER — HEALTH MAINTENANCE LETTER (OUTPATIENT)
Age: 23
End: 2023-06-02

## 2024-06-23 ENCOUNTER — HEALTH MAINTENANCE LETTER (OUTPATIENT)
Age: 24
End: 2024-06-23

## 2024-08-26 NOTE — TELEPHONE ENCOUNTER
Pt did not respond to evisit. Please call her to follow-up on suicidal ideation   Detail Level: Simple Additional Notes: .\\nExplained it is WNL, pt reports no recent changes, will monitor for any change no bx needed at this time Render Risk Assessment In Note?: no

## 2025-07-12 ENCOUNTER — HEALTH MAINTENANCE LETTER (OUTPATIENT)
Age: 25
End: 2025-07-12